# Patient Record
Sex: FEMALE | Race: WHITE
[De-identification: names, ages, dates, MRNs, and addresses within clinical notes are randomized per-mention and may not be internally consistent; named-entity substitution may affect disease eponyms.]

---

## 2020-10-24 ENCOUNTER — HOSPITAL ENCOUNTER (EMERGENCY)
Dept: HOSPITAL 61 - ER | Age: 25
Discharge: HOME | End: 2020-10-24
Payer: SELF-PAY

## 2020-10-24 VITALS — DIASTOLIC BLOOD PRESSURE: 86 MMHG | SYSTOLIC BLOOD PRESSURE: 133 MMHG

## 2020-10-24 VITALS — BODY MASS INDEX: 24.02 KG/M2 | WEIGHT: 122.36 LBS | HEIGHT: 60 IN

## 2020-10-24 DIAGNOSIS — I48.91: ICD-10-CM

## 2020-10-24 DIAGNOSIS — I25.2: ICD-10-CM

## 2020-10-24 DIAGNOSIS — R07.89: Primary | ICD-10-CM

## 2020-10-24 DIAGNOSIS — R05: ICD-10-CM

## 2020-10-24 DIAGNOSIS — Z86.718: ICD-10-CM

## 2020-10-24 LAB
ALBUMIN SERPL-MCNC: 3.9 G/DL (ref 3.4–5)
ALBUMIN/GLOB SERPL: 1.2 {RATIO} (ref 1–1.7)
ALP SERPL-CCNC: 48 U/L (ref 46–116)
ALT SERPL-CCNC: 39 U/L (ref 14–59)
ANION GAP SERPL CALC-SCNC: 7 MMOL/L (ref 6–14)
AST SERPL-CCNC: 22 U/L (ref 15–37)
BASOPHILS # BLD AUTO: 0.1 X10^3/UL (ref 0–0.2)
BASOPHILS NFR BLD: 1 % (ref 0–3)
BILIRUB SERPL-MCNC: 0.4 MG/DL (ref 0.2–1)
BUN SERPL-MCNC: 15 MG/DL (ref 7–20)
BUN/CREAT SERPL: 14 (ref 6–20)
CALCIUM SERPL-MCNC: 8.7 MG/DL (ref 8.5–10.1)
CHLORIDE SERPL-SCNC: 103 MMOL/L (ref 98–107)
CO2 SERPL-SCNC: 28 MMOL/L (ref 21–32)
CREAT SERPL-MCNC: 1.1 MG/DL (ref 0.6–1)
EOSINOPHIL NFR BLD: 0.1 X10^3/UL (ref 0–0.7)
EOSINOPHIL NFR BLD: 2 % (ref 0–3)
ERYTHROCYTE [DISTWIDTH] IN BLOOD BY AUTOMATED COUNT: 12.7 % (ref 11.5–14.5)
GFR SERPLBLD BASED ON 1.73 SQ M-ARVRAT: 59.6 ML/MIN
GLUCOSE SERPL-MCNC: 102 MG/DL (ref 70–99)
HCT VFR BLD CALC: 42.6 % (ref 36–47)
HGB BLD-MCNC: 15 G/DL (ref 12–15.5)
LYMPHOCYTES # BLD: 2.4 X10^3/UL (ref 1–4.8)
LYMPHOCYTES NFR BLD AUTO: 27 % (ref 24–48)
MCH RBC QN AUTO: 32 PG (ref 25–35)
MCHC RBC AUTO-ENTMCNC: 35 G/DL (ref 31–37)
MCV RBC AUTO: 90 FL (ref 79–100)
MONO #: 0.7 X10^3/UL (ref 0–1.1)
MONOCYTES NFR BLD: 8 % (ref 0–9)
NEUT #: 5.6 X10^3/UL (ref 1.8–7.7)
NEUTROPHILS NFR BLD AUTO: 63 % (ref 31–73)
PLATELET # BLD AUTO: 185 X10^3/UL (ref 140–400)
POTASSIUM SERPL-SCNC: 3.8 MMOL/L (ref 3.5–5.1)
PROT SERPL-MCNC: 7.2 G/DL (ref 6.4–8.2)
RBC # BLD AUTO: 4.72 X10^6/UL (ref 3.5–5.4)
SODIUM SERPL-SCNC: 138 MMOL/L (ref 136–145)
U PREG PATIENT: NEGATIVE
WBC # BLD AUTO: 8.9 X10^3/UL (ref 4–11)

## 2020-10-24 PROCEDURE — 84484 ASSAY OF TROPONIN QUANT: CPT

## 2020-10-24 PROCEDURE — 81025 URINE PREGNANCY TEST: CPT

## 2020-10-24 PROCEDURE — 85025 COMPLETE CBC W/AUTO DIFF WBC: CPT

## 2020-10-24 PROCEDURE — 80053 COMPREHEN METABOLIC PANEL: CPT

## 2020-10-24 PROCEDURE — 93005 ELECTROCARDIOGRAM TRACING: CPT

## 2020-10-24 PROCEDURE — 71275 CT ANGIOGRAPHY CHEST: CPT

## 2020-10-24 PROCEDURE — 99285 EMERGENCY DEPT VISIT HI MDM: CPT

## 2020-10-24 PROCEDURE — 36415 COLL VENOUS BLD VENIPUNCTURE: CPT

## 2020-10-24 PROCEDURE — 85379 FIBRIN DEGRADATION QUANT: CPT

## 2020-10-24 PROCEDURE — 71045 X-RAY EXAM CHEST 1 VIEW: CPT

## 2020-10-24 NOTE — RAD
Exam: CT of chest with contrast

 

INDICATION: Chest pain

 

TECHNIQUE: Sequential axial images through the chest obtained following 

the administration of 100 mL of Isovue-370 IV contrast. Sagittal and 

coronal reformatted images were reconstructed from the axial data and 

reviewed. 3-D reformatted images were reconstructed from the axial data 

and reviewed.

 

Comparisons: Chest x-ray same day

 

FINDINGS:

Visual is portions of the thyroid are unremarkable. No enlarged 

mediastinal lymph nodes are identified.

 

Heart size is normal. No pericardial effusion. Thoracic aorta has a normal

course and caliber. Pulmonary artery is not enlarged. No pulmonary embolus

identified within the main, lobar or segmental pulmonary arteries.

 

Airways are patent. No consolidation or pneumothorax. No suspicious lung 

nodules.

 

No pleural effusion or thickening.

 

Visualized upper abdomen is unremarkable.

 

No suspicious osseous lesions or acute fractures.

 

IMPRESSION:

No pulmonary embolus identified within the main, lobar or segmental 

pulmonary arteries.

 

 

Exposure: One or more of the following in the visualized dose reduction 

techniques were utilized for this examination:

1.  Automated exposure control

2.  Adjustment of the MA and/or KV according to patient size

3.  Use of iterative of reconstructive technique

 

Electronically signed by: Ravin Kirk MD (10/24/2020 10:51 PM) [id number]

## 2020-10-24 NOTE — RAD
Exam: Chest one view

 

INDICATION: Chest pain

 

TECHNIQUE: Frontal view of the chest

 

Comparisons: None

 

FINDINGS:

The cardiomediastinal silhouette and pulmonary vessels are within normal 

limits.

 

The lung and pleural spaces are clear.

 

IMPRESSION:

No acute cardiopulmonary process.

 

Electronically signed by: Ravin Kirk MD (10/24/2020 10:44 PM) [id number]

## 2020-10-24 NOTE — PHYS DOC
General Adult


EDM:


Chief Complaint:  CHEST PAIN





HPI:


HPI:





Patient is a 25  year old female who states she has a past medical history A. 

fib DVT MI x 2--who is currently on no medications presents with a chief 

complaint of chest pain.  Patient states chest pain started prior to arrival 

located in the center of her chest with radiation to the left shoulder.  Patient

states she has associated cough without sputum production shortness of air and 

feels nauseous.





Review of Systems:


Review of Systems:


Constitutional:   Denies fever or chills. []


Eyes:   Denies change in visual acuity. []


HENT:   Denies nasal congestion or sore throat. [] 


Respiratory:   Positive shortness of breath


Cardiovascular:   Positive chest pain


GI:   Denies abdominal pain, , vomiting, bloody stools or diarrhea. [Positive 

nausea] 


:  Denies dysuria. [] 


Musculoskeletal:   Denies back pain or joint pain. [] 


Integument:   Denies rash. [] 


Neurologic:   Denies headache, focal weakness or sensory changes. [] 


Endocrine:   Denies polyuria or polydipsia. [] 


Lymphatic:  Denies swollen glands. [] 


Psychiatric:  Denies depression or anxiety. []





Heart Score:


HEART Score for Chest Pain:  








HEART Score for Chest Pain Response (Comments) Value


 


History Slighlty/Non-Suspicious 0


 


ECG Normal 0


 


Age < 45 0


 


Risk Factors                            1 or 2 Risk Factors 1


 


Troponin < Normal Limit 0


 


Total  1








Risk Factors:


Risk Factors:  DM, Current or recent (<one month) smoker, HTN, HLP, family 

history of CAD, obesity.


Risk Scores:


Score 0 - 3:  2.5% MACE over next 6 weeks - Discharge Home


Score 4 - 6:  20.3% MACE over next 6 weeks - Admit for Clinical Observation


Score 7 - 10:  72.7% MACE over next 6 weeks - Early Invasive Strategies





Physical Exam:


PE:





Constitutional: Well developed, well nourished, no acute distress, non-toxic 

appearance. []


HENT: Normocephalic, atraumatic, bilateral external ears normal, oropharynx 

moist, no oral exudates, nose normal. []


Eyes: PERRLA, EOMI, conjunctiva normal, no discharge. [] 


Neck: Normal range of motion, no tenderness, supple, no stridor. [] 


Cardiovascular:Heart rate regular rhythm, no murmur []


Lungs & Thorax:  Bilateral breath sounds clear to auscultation []


Abdomen: Bowel sounds normal, soft, no tenderness, no masses, no pulsatile 

masses. [] 


Skin: Warm, dry, no erythema, no rash. [] 


Back: No tenderness, no CVA tenderness. [] 


Extremities: No tenderness, no cyanosis, no clubbing, ROM intact, no edema. [] 


Neurologic: Alert and oriented X 3, normal motor function, normal sensory 

function, no focal deficits noted. []


Psychologic: Affect normal, judgement normal, mood normal. []





EKG:


EKG:


EKG performed at 2129 heart rate 75 normal sinus rhythm no acute abnormalities 

[]





Radiology/Procedures:


Radiology/Procedures:


[]


Impression:


FINDINGS:CHEST XRAY


The cardiomediastinal silhouette and pulmonary vessels are within normal 


limits.


 


The lung and pleural spaces are clear.


 


IMPRESSION:


No acute cardiopulmonary process.








FINDINGS:ct CHEST


The cardiomediastinal silhouette and pulmonary vessels are within normal 


limits.


 


The lung and pleural spaces are clear.


 


IMPRESSION:


No acute cardiopulmonary process.





Course & Med Decision Making:


Course & Med Decision Making


Pertinent Labs and Imaging studies reviewed. (See chart for details)





[] Patient was evaluated for chief complaint.  Work-up consisted of laboratory 

analysis radiologic imaging and EKG.  EKG without acute ischemic changes.  

Troponin within normal limits D-dimer very slightly elevated.  Chest x-ray 

within normal limits CT angio chest no acute abnormalities.  Patient was treated

 with a GI cocktail.  She was discharged home with instruction to follow-up with

 primary care physician.





Declan Disclaimer:


Dragon Disclaimer:


This electronic medical record was generated, in whole or in part, using a voice

 recognition dictation system.





Departure


Departure


Impression:  


   Primary Impression:  


   Chest pain


Disposition:  01 DC HOME SELF CARE/HOMELESS


Condition:  STABLE


Patient Instructions:  Chest Pain (Nonspecific)











GRADY TELLEZ DO            Oct 24, 2020 21:40

## 2020-10-25 NOTE — EKG
[facility]

              [address] [place] [address]

Test Date:    2020-10-24               Test Time:    21:29:19

Pat Name:     KAROLINA DORAN             Department:   

Patient ID:   [id number]           Room:          

Gender:       F                        Technician:   

:          [id number]               Requested By: GRADY TELLEZ

Order Number: [id number]           Reading MD:     

                                 Measurements

Intervals                              Axis          

Rate:         75                       P:            56

WY:           144                      QRS:          66

QRSD:         72                       T:            28

QT:           346                                    

QTc:          389                                    

                           Interpretive Statements

SINUS RHYTHM

NORMAL ECG

RI6.02

No previous ECG available for comparison